# Patient Record
Sex: FEMALE | Employment: OTHER | URBAN - METROPOLITAN AREA
[De-identification: names, ages, dates, MRNs, and addresses within clinical notes are randomized per-mention and may not be internally consistent; named-entity substitution may affect disease eponyms.]

---

## 2022-03-01 ENCOUNTER — NURSING HOME VISIT (OUTPATIENT)
Dept: GERIATRICS | Facility: OTHER | Age: 87
End: 2022-03-01
Payer: MEDICARE

## 2022-03-01 DIAGNOSIS — F41.9 ANXIETY: ICD-10-CM

## 2022-03-01 DIAGNOSIS — I48.20 CHRONIC ATRIAL FIBRILLATION (HCC): ICD-10-CM

## 2022-03-01 DIAGNOSIS — I50.32 CHRONIC DIASTOLIC CONGESTIVE HEART FAILURE (HCC): ICD-10-CM

## 2022-03-01 DIAGNOSIS — K21.00 GASTROESOPHAGEAL REFLUX DISEASE WITH ESOPHAGITIS WITHOUT HEMORRHAGE: ICD-10-CM

## 2022-03-01 DIAGNOSIS — D62 ACUTE BLOOD LOSS ANEMIA: ICD-10-CM

## 2022-03-01 DIAGNOSIS — I10 ESSENTIAL HYPERTENSION: ICD-10-CM

## 2022-03-01 DIAGNOSIS — R04.0 EPISTAXIS: Primary | ICD-10-CM

## 2022-03-01 DIAGNOSIS — F32.A DEPRESSION, UNSPECIFIED DEPRESSION TYPE: ICD-10-CM

## 2022-03-01 PROBLEM — Z95.2 S/P TAVR (TRANSCATHETER AORTIC VALVE REPLACEMENT): Status: ACTIVE | Noted: 2019-08-19

## 2022-03-01 PROBLEM — R10.13 EPIGASTRIC ABDOMINAL PAIN: Status: ACTIVE | Noted: 2021-04-15

## 2022-03-01 PROBLEM — R13.10 DYSPHAGIA: Status: ACTIVE | Noted: 2018-08-08

## 2022-03-01 PROBLEM — K44.9 PARAESOPHAGEAL HERNIA: Status: ACTIVE | Noted: 2019-01-02

## 2022-03-01 PROBLEM — R55 NEAR SYNCOPE: Status: ACTIVE | Noted: 2020-04-29

## 2022-03-01 PROBLEM — R10.9 ABDOMINAL PAIN: Status: ACTIVE | Noted: 2021-04-29

## 2022-03-01 PROBLEM — K74.69 OTHER CIRRHOSIS OF LIVER (HCC): Status: ACTIVE | Noted: 2021-05-03

## 2022-03-01 PROBLEM — N18.31 STAGE 3A CHRONIC KIDNEY DISEASE (HCC): Status: ACTIVE | Noted: 2021-04-30

## 2022-03-01 PROBLEM — Z95.0 PACEMAKER: Status: ACTIVE | Noted: 2017-02-23

## 2022-03-01 PROBLEM — K21.9 GERD (GASTROESOPHAGEAL REFLUX DISEASE): Status: ACTIVE | Noted: 2022-03-01

## 2022-03-01 PROCEDURE — 99306 1ST NF CARE HIGH MDM 50: CPT | Performed by: INTERNAL MEDICINE

## 2022-03-01 RX ORDER — METOPROLOL SUCCINATE 25 MG/1
25 TABLET, EXTENDED RELEASE ORAL DAILY
COMMUNITY
Start: 2022-03-01 | End: 2023-03-01

## 2022-03-01 RX ORDER — MIRTAZAPINE 15 MG/1
15 TABLET, FILM COATED ORAL
COMMUNITY

## 2022-03-01 RX ORDER — FUROSEMIDE 20 MG/1
20 TABLET ORAL DAILY
COMMUNITY

## 2022-03-01 RX ORDER — CLONAZEPAM 0.5 MG/1
TABLET ORAL 4 TIMES DAILY
COMMUNITY

## 2022-03-01 RX ORDER — DULOXETIN HYDROCHLORIDE 60 MG/1
60 CAPSULE, DELAYED RELEASE ORAL 2 TIMES DAILY
COMMUNITY

## 2022-03-01 RX ORDER — NITROGLYCERIN 0.4 MG/1
0.4 TABLET SUBLINGUAL AS NEEDED
COMMUNITY

## 2022-03-01 RX ORDER — PANTOPRAZOLE SODIUM 40 MG/1
1 TABLET, DELAYED RELEASE ORAL
COMMUNITY
Start: 2021-05-03 | End: 2022-05-03

## 2022-03-01 RX ORDER — GABAPENTIN 100 MG/1
100 CAPSULE ORAL EVERY 12 HOURS
COMMUNITY

## 2022-03-01 RX ORDER — LISINOPRIL 40 MG/1
40 TABLET ORAL DAILY
COMMUNITY

## 2022-03-01 RX ORDER — ACETAMINOPHEN 325 MG/1
650 TABLET ORAL EVERY 6 HOURS PRN
COMMUNITY

## 2022-03-01 NOTE — ASSESSMENT & PLAN NOTE
S/p rhino rocket removed on 2/19 then had IR perform embolization of the external carotid branches on 2/18  Also received transfusion due to amount of blood loss  Continue monitoring any further symptoms  Was restarted back on eliquis

## 2022-03-01 NOTE — PROGRESS NOTES
[facility] notes  SHORT TERM REHAB       NAME: Deneen Rosario  AGE: 80 y o  SEX: female    DATE OF ENCOUNTER: 3/1/2022    Assessment and Plan   Epistaxis  S/p rhino rocket removed on 2/19 then had IR perform embolization of the external carotid branches on 2/18  Also received transfusion due to amount of blood loss  Continue monitoring any further symptoms  Was restarted back on eliquis     Acute blood loss anemia  Hb on discharge was 9 ( but mostly running between 7-8) and had transfusion when it had dropped to 6 8 on 2/21/22  Continue monitoring for bleeding  Will repeat CBC     Atrial fibrillation (HCC)  Rate controlled  Continue current meds  Has a pacemaker  Continue eliquis and monitor for bleeding     Chronic diastolic congestive heart failure (HCC)  Continue lasix  Seems euvolemic  Continue monitoring for worsening signs and and symptoms of CHF  Continue monitoring weight           Anxiety  Reports taking 1/2 tab in am and 1 tab 3 times a day after but records indicate patient on 1 tab 3 times a day  Continue current meds  Continue monitoring for worsening symptoms     GERD (gastroesophageal reflux disease)  EGD done this visit showed grade B esophagitis  Continue PPI  Son reports intermittently she has abdominal pain which is relieved by maalox but patient does not like it much    Essential hypertension  Continue current meds  Continue monitoring BP    Depression  Continue current meds  Continue monitoring symptoms         Chief Complaint     Feeling anxious    History of Present Illness     79 yo female [facility] for [facility]  Patient reports that she has had on and off nose bleed but usually resolves but this last time it was not stopping she press her help button who recommended to go to the ER  She was admitted and evaluated by ENT  She had her eliquis held   She had rhino rocket placed then had IR embolize her bilateral external carotid branches  She then had GI evaluate and do an EGD which showed grade B esophagitis and a flex sig which showed diverticulosis  She also had transfusion during her stay  Once she was stable she was discharged to [address] [place] [address] for therapy  Also called and talked to son who confirmed the history and also report that she does have significant history of anxiety and GERD  Reviewed labs and imaging reports from the hospital     PMHx     Past Medical History:   Diagnosis Date    Anemia requiring transfusions     Anxiety     Aortic stenosis     Arthritis     Atrial fibrillation (HCC)     Cervical dystonia     Coronary artery disease     Depression     Dysphagia     Elevated LFTs     Gait difficulty     GERD (gastroesophageal reflux disease)     Hiatal hernia     HL (hearing loss)     Hyperlipidemia     Hypertension     Myocardial infarct (HCC)     Neuralgia     [facility])      Past Surgical History:   Procedure Laterality Date    ANOMALOUS PULMONARY VENOUS RETURN REPAIR, TOTAL      AORTIC VALVE REPLACEMENT      CARDIAC PACEMAKER PLACEMENT      COLONOSCOPY      JOINT REPLACEMENT      KNEE SURGERY      PARAESOPHAGEAL HERNIA REPAIR      UPPER GASTROINTESTINAL ENDOSCOPY       Family History   Problem Relation Age of Onset    Stroke Mother     Hypertension Mother     Heart disease Father     Heart disease Paternal Uncle      Social History     Socioeconomic History    Marital status:       Spouse name: None    Number of children: None    Years of education: None    Highest education level: None   Occupational History    None   Tobacco Use    Smoking status: Never Smoker    Smokeless tobacco: Never Used   Substance and Sexual Activity    Alcohol use: Never    Drug use: Never    Sexual activity: None   Other Topics Concern    None   Social History Narrative    None     Social Determinants of Health     Financial Resource Strain: Not on file   Food Insecurity: Not on file   Transportation Needs: Not on file   Physical Activity: Not on file   Stress: Not on file   Social Connections: Not on file   Intimate Partner Violence: Not on file   Housing Stability: Not on file     Allergies   Allergen Reactions    Sulfa Antibiotics Shortness Of Breath    Lactose - Food Allergy Diarrhea and Other (See Comments)     Intolerance- gets gassy or diarrhea    Penicillins Rash       Review of Systems     Anxious  Shortness of breath at times with exertion  All other review of system negative      Objective   Vital signs:  BP: 118/53  HR: 71  RR: 18  TEMP: 97 9F  SAT : 97% on RA    PHYSICAL EXAM:  GENERAL: no acute distress  SKIN: warm, dry, no rash, no cyanosis  HEENT: normocephalic, atraumatic, no JVD, no Thyromegaly, no lymphadenopathy  LUNGS: CTA, no wheezing, no rales, expanded equally, no chest tenderness   HEART: normal rhythm, normal rate, no murmur, no gallop, PPM on left upper chest wall  ABDOMEN: soft non tender non distended bs+, no guarding or rebound tenderness  :  no suprapubic tenderness  MUSCULOSKELETAL: strength about 4+/5 all extremities,decreased ROM within shoulders R>L, no edema of the legs, no calf tenderness  NEUROLOGY: awake, alert, Ox3 except got day of the week wrong, able to recall 1/3 object but had difficulty with placing the time on the clock  CN2-12 intact  PSYCH: cooperative, pleasant  Pertinent Laboratory/Diagnostic Studies:  Recent labs and diagnostic tests reviewed in nursing home EMR    Current Medications   Medications reviewed and signed off on nursing home EMR

## 2022-03-01 NOTE — ASSESSMENT & PLAN NOTE
EGD done this visit showed grade B esophagitis  Continue PPI  Son reports intermittently she has abdominal pain which is relieved by maalox but patient does not like it much

## 2022-03-01 NOTE — ASSESSMENT & PLAN NOTE
Continue lasix  Seems euvolemic  Continue monitoring for worsening signs and and symptoms of CHF  Continue monitoring weight

## 2022-03-01 NOTE — ASSESSMENT & PLAN NOTE
Hb on discharge was 9 ( but mostly running between 7-8) and had transfusion when it had dropped to 6 8 on 2/21/22  Continue monitoring for bleeding  Will repeat CBC

## 2022-03-01 NOTE — ASSESSMENT & PLAN NOTE
Reports taking 1/2 tab in am and 1 tab 3 times a day after but records indicate patient on 1 tab 3 times a day  Continue current meds  Continue monitoring for worsening symptoms

## 2022-03-09 ENCOUNTER — NURSING HOME VISIT (OUTPATIENT)
Dept: GERIATRICS | Facility: OTHER | Age: 87
End: 2022-03-09
Payer: MEDICARE

## 2022-03-09 DIAGNOSIS — R04.0 EPISTAXIS: Primary | ICD-10-CM

## 2022-03-09 DIAGNOSIS — D62 ACUTE BLOOD LOSS ANEMIA: ICD-10-CM

## 2022-03-09 DIAGNOSIS — F41.9 ANXIETY: ICD-10-CM

## 2022-03-09 DIAGNOSIS — E87.6 HYPOKALEMIA: ICD-10-CM

## 2022-03-09 PROCEDURE — 99309 SBSQ NF CARE MODERATE MDM 30: CPT | Performed by: INTERNAL MEDICINE

## 2022-03-09 RX ORDER — IPRATROPIUM BROMIDE AND ALBUTEROL SULFATE 2.5; .5 MG/3ML; MG/3ML
3 SOLUTION RESPIRATORY (INHALATION) EVERY 4 HOURS PRN
COMMUNITY

## 2022-03-09 NOTE — ASSESSMENT & PLAN NOTE
Patient still concerned that there is something there after the procedure  Okayed for patient to follow up with ENT as scheduled     Currently no signs of bleeding

## 2022-03-09 NOTE — ASSESSMENT & PLAN NOTE
Had to increase meds back to the level she was taking back home at  [facility] to be better since then  Continue monitoring symptoms  Continue current meds

## 2022-03-09 NOTE — PROGRESS NOTES
[facility] notes  SHORT TERM REHAB       NAME: Chiqui Mortensen  AGE: 80 y o  SEX: female    DATE OF ENCOUNTER: 3/9/2022    Assessment and Plan   Epistaxis  Patient still concerned that there is something there after the procedure  Okayed for patient to follow up with ENT as scheduled  Currently no signs of bleeding     Acute blood loss anemia  Hb was 8 3 on labs 2 days ago  Continue monitoring for bleeding      Hypokalemia  Supplemented k+    Anxiety  Had to increase meds back to the level she was taking back home at  [facility] to be better since then  Continue monitoring symptoms  Continue current meds        Chief Complaint     No new complaints    History of Present Illness     81 yo female seen for follow up  Patient seen for her epistaxis, anemia, hypokalemia and anxiety  Reviewed nursing notes and also reviewed therapy notes since last visit  PMHx     Past Medical History:   Diagnosis Date    Anemia requiring transfusions     Anxiety     Aortic stenosis     Arthritis     Atrial fibrillation (HCC)     Cervical dystonia     Coronary artery disease     Depression     Dysphagia     Elevated LFTs     Gait difficulty     GERD (gastroesophageal reflux disease)     Hiatal hernia     HL (hearing loss)     Hyperlipidemia     Hypertension     Myocardial infarct (HCC)     Neuralgia     SSS (sick sinus syndrome) (HCC)      Past Surgical History:   Procedure Laterality Date    ANOMALOUS PULMONARY VENOUS RETURN REPAIR, TOTAL      AORTIC VALVE REPLACEMENT      CARDIAC PACEMAKER PLACEMENT      COLONOSCOPY      JOINT REPLACEMENT      KNEE SURGERY      PARAESOPHAGEAL HERNIA REPAIR      UPPER GASTROINTESTINAL ENDOSCOPY       Family History   Problem Relation Age of Onset    Stroke Mother     Hypertension Mother     Heart disease Father     Heart disease Paternal Uncle      Social History     Socioeconomic History    Marital status:       Spouse name: Not on file    Number of children: Not on file    Years of education: Not on file    Highest education level: Not on file   Occupational History    Not on file   Tobacco Use    Smoking status: Never Smoker    Smokeless tobacco: Never Used   Substance and Sexual Activity    Alcohol use: Never    Drug use: Never    Sexual activity: Not on file   Other Topics Concern    Not on file   Social History Narrative    Not on file     Social Determinants of Health     Financial Resource Strain: Not on file   Food Insecurity: Not on file   Transportation Needs: Not on file   Physical Activity: Not on file   Stress: Not on file   Social Connections: Not on file   Intimate Partner Violence: Not on file   Housing Stability: Not on file     Allergies   Allergen Reactions    Sulfa Antibiotics Shortness Of Breath    Lactose - Food Allergy Diarrhea and Other (See Comments)     Intolerance- gets gassy or diarrhea    Penicillins Rash       Review of Systems     Denies any pain at present but does report had some pain after eating when they tried to do therapy, states it happens after she eats sometimes so now they are going to give some time after she eats then do therapy  All other review of system negative        Objective   Vital signs:  BP: 135/88  HR: 76  RR: 18  TEMP: 98 9F    PHYSICAL EXAM:  GENERAL: no acute distress  SKIN: warm, dry, no rash, no cyanosis  HEENT: normocephalic, atraumatic, no JVD, no Thyromegaly, no lymphadenopathy  LUNGS: CTA, no wheezing, no rales, expanded equally, no chest tenderness   HEART: normal rhythm, normal rate, no murmur, no gallop, PPM on left chest wall  ABDOMEN: soft non tender non distended bs+, no guarding or rebound tenderness  :  no suprapubic tenderness  MUSCULOSKELETAL: strength about 4+/5 all extremities, no calf tenderness  NEUROLOGY: awake, alert, Ox3, CN2-12 intact  PSYCH: cooperative, pleasant         Pertinent Laboratory/Diagnostic Studies:  Recent labs and diagnostic tests reviewed in nursing home EMR    Current Medications   Medications reviewed

## 2022-03-16 ENCOUNTER — NURSING HOME VISIT (OUTPATIENT)
Dept: GERIATRICS | Facility: OTHER | Age: 87
End: 2022-03-16
Payer: MEDICARE

## 2022-03-16 DIAGNOSIS — N18.31 STAGE 3A CHRONIC KIDNEY DISEASE (HCC): ICD-10-CM

## 2022-03-16 DIAGNOSIS — R04.0 EPISTAXIS: Primary | ICD-10-CM

## 2022-03-16 DIAGNOSIS — I50.32 CHRONIC DIASTOLIC CONGESTIVE HEART FAILURE (HCC): ICD-10-CM

## 2022-03-16 PROCEDURE — 99309 SBSQ NF CARE MODERATE MDM 30: CPT | Performed by: INTERNAL MEDICINE

## 2022-03-16 NOTE — PROGRESS NOTES
[facility] notes  SHORT TERM REHAB       NAME: Thang Blackburn  AGE: 80 y o  SEX: female    DATE OF ENCOUNTER: 3/16/2022    Assessment and Plan   Epistaxis  Hb 9 0 on 3/14/22  Seen by ENT and reviewed follow up recommendations  Continue monitoring symptoms   Continue ocean nasal spray      Chronic diastolic congestive heart failure ([address] )  Had increased dose of lasix due to increase in swelling and weight but back down  Continue current meds  Continue monitoring weight     Stage 3a chronic kidney disease (HCC)  Cr  0 9 on 3/14/22 seems to be better then usual baseline   Continue monitoring as out patient       Chief Complaint     No new complaints    History of Present Illness     81 yo female seen for follow up  Patient seen for epistaxis, CHF and CKDIII  Reviewed nursing notes since last visit  PMHx     Past Medical History:   Diagnosis Date    Anemia requiring transfusions     Anxiety     Aortic stenosis     Arthritis     Atrial fibrillation (HCC)     Cervical dystonia     Coronary artery disease     Depression     Dysphagia     Elevated LFTs     Gait difficulty     GERD (gastroesophageal reflux disease)     Hiatal hernia     HL (hearing loss)     Hyperlipidemia     Hypertension     Myocardial infarct (HCC)     Neuralgia     [facility]SS [facility]ick [facility] syndrome) ([facility])      Past Surgical History:   Procedure Laterality Date    ANOMALOUS PULMONARY VENOUS RETURN REPAIR, TOTAL      AORTIC VALVE REPLACEMENT      CARDIAC PACEMAKER PLACEMENT      COLONOSCOPY      JOINT REPLACEMENT      KNEE SURGERY      PARAESOPHAGEAL HERNIA REPAIR      UPPER GASTROINTESTINAL ENDOSCOPY       Family History   Problem Relation Age of Onset    Stroke Mother     Hypertension Mother     Heart disease Father     Heart disease Paternal Uncle      Social History     Socioeconomic History    Marital status:       Spouse name: Not on file    Number of children: Not on file    Years of education: Not on file    Highest education level: Not on file   Occupational History    Not on file   Tobacco Use    Smoking status: Never Smoker    Smokeless tobacco: Never Used   Substance and Sexual Activity    Alcohol use: Never    Drug use: Never    Sexual activity: Not on file   Other Topics Concern    Not on file   Social History Narrative    Not on file     Social Determinants of Health     Financial Resource Strain: Not on file   Food Insecurity: Not on file   Transportation Needs: Not on file   Physical Activity: Not on file   Stress: Not on file   Social Connections: Not on file   Intimate Partner Violence: Not on file   Housing Stability: Not on file     Allergies   Allergen Reactions    Sulfa Antibiotics Shortness Of Breath    Lactose - Food Allergy Diarrhea and Other (See Comments)     Intolerance- gets gassy or diarrhea    Penicillins Rash       Review of Systems     Denies any pain or shortness of breath  Some complaints of ACE wrap being to tight   All other review of system negative        Objective   Vital signs:  BP: 120/70  HR: 72  RR: 18  TEMP: 97 9F    PHYSICAL EXAM:  GENERAL: no acute distress  SKIN: warm, dry, no rash, no cyanosis  HEENT: normocephalic, atraumatic, no JVD, no Thyromegaly, no lymphadenopathy  LUNGS: CTA, no wheezing, no rales, expanded equally, no chest tenderness   HEART: normal rhythm, normal rate, no murmur, no gallop, PPM on the left chest wall  ABDOMEN: soft non tender non distended bs+, no guarding or rebound tenderness  :  no suprapubic tenderness  MUSCULOSKELETAL: strength about 4+/5 all extremities, no calf tenderness  NEUROLOGY: awake, alert, Ox3, CN2-12 intact  PSYCH: cooperative, pleasant         Pertinent Laboratory/Diagnostic Studies:  Recent labs and diagnostic tests reviewed in nursing home EMR    Current Medications   Medications reviewed

## 2022-03-16 NOTE — ASSESSMENT & PLAN NOTE
Had increased dose of lasix due to increase in swelling and weight but back down  Continue current meds  Continue monitoring weight

## 2022-03-16 NOTE — ASSESSMENT & PLAN NOTE
Hb 9 0 on 3/14/22  Seen by ENT and reviewed follow up recommendations  Continue monitoring symptoms   Continue ocean nasal spray

## 2022-03-22 ENCOUNTER — NURSING HOME VISIT (OUTPATIENT)
Dept: GERIATRICS | Facility: OTHER | Age: 87
End: 2022-03-22
Payer: MEDICARE

## 2022-03-22 DIAGNOSIS — R26.2 AMBULATORY DYSFUNCTION: ICD-10-CM

## 2022-03-22 DIAGNOSIS — R04.0 EPISTAXIS: Primary | ICD-10-CM

## 2022-03-22 DIAGNOSIS — I50.32 CHRONIC DIASTOLIC CONGESTIVE HEART FAILURE (HCC): ICD-10-CM

## 2022-03-22 PROCEDURE — 99309 SBSQ NF CARE MODERATE MDM 30: CPT | Performed by: INTERNAL MEDICINE

## 2022-03-22 NOTE — ASSESSMENT & PLAN NOTE
Reviewed therapy notes  Continues with therapy  Walks 150-200 feet with supervision  Continue with safety measures

## 2022-03-22 NOTE — ASSESSMENT & PLAN NOTE
Weight up slightly  Swelling also up slightly  Will increase lasix by 20mg po daily x 3 days then back to the usual dose  Continue monitoring weight  Continue monitoring swelling

## 2022-03-22 NOTE — PROGRESS NOTES
[facility] notes  SHORT TERM REHAB       NAME: Monica Pickens  AGE: 80 y o  SEX: female    DATE OF ENCOUNTER: 3/22/2022    Assessment and Plan   Epistaxis  No further signs of bleeding  Stable  Continue monitoring  Continue ocean nasal spray      Ambulatory dysfunction  Reviewed therapy notes  Continues with therapy  Walks 150-200 feet with supervision  Continue with safety measures       Chronic diastolic congestive heart failure (HCC)  Weight up slightly  Swelling also up slightly  Will increase lasix by 20mg po daily x 3 days then back to the usual dose  Continue monitoring weight  Continue monitoring swelling          Chief Complaint     No new complaints    History of Present Illness     81 yo female seen for follow up  Patient seen for her epistaxis, ambulatory dysfunction and CHF  Reviewed nursing notes since last visit  PMHx     Past Medical History:   Diagnosis Date    Anemia requiring transfusions     Anxiety     Aortic stenosis     Arthritis     Atrial fibrillation (HCC)     Cervical dystonia     Coronary artery disease     Depression     Dysphagia     Elevated LFTs     Gait difficulty     GERD (gastroesophageal reflux disease)     Hiatal hernia     HL (hearing loss)     Hyperlipidemia     Hypertension     Myocardial infarct (HCC)     Neuralgia     [facility]) ([facility])      Past Surgical History:   Procedure Laterality Date    ANOMALOUS PULMONARY VENOUS RETURN REPAIR, TOTAL      AORTIC VALVE REPLACEMENT      CARDIAC PACEMAKER PLACEMENT      COLONOSCOPY      JOINT REPLACEMENT      KNEE SURGERY      PARAESOPHAGEAL HERNIA REPAIR      UPPER GASTROINTESTINAL ENDOSCOPY       Family History   Problem Relation Age of Onset    Stroke Mother     Hypertension Mother     Heart disease Father     Heart disease Paternal Uncle      Social History     Socioeconomic History    Marital status:       Spouse name: Not on file    Number of children: Not on file    Years of education: Not on file    Highest education level: Not on file   Occupational History    Not on file   Tobacco Use    Smoking status: Never Smoker    Smokeless tobacco: Never Used   Substance and Sexual Activity    Alcohol use: Never    Drug use: Never    Sexual activity: Not on file   Other Topics Concern    Not on file   Social History Narrative    Not on file     Social Determinants of Health     Financial Resource Strain: Not on file   Food Insecurity: Not on file   Transportation Needs: Not on file   Physical Activity: Not on file   Stress: Not on file   Social Connections: Not on file   Intimate Partner Violence: Not on file   Housing Stability: Not on file     Allergies   Allergen Reactions    Sulfa Antibiotics Shortness Of Breath    Lactose - Food Allergy Diarrhea and Other (See Comments)     Intolerance- gets gassy or diarrhea    Penicillins Rash       Review of Systems     Denies any pain or shortness of breath  Some leg swelling   All other review of system negative    Objective   Vital signs:  BP: 108/69  HR: 76  RR: 18  TEMP: 98 0F    PHYSICAL EXAM:  GENERAL: no acute distress  SKIN: warm, dry, no rash, no cyanosis  HEENT: normocephalic, atraumatic, no JVD, no Thyromegaly, no lymphadenopathy  LUNGS: CTA, no wheezing, no rales, expanded equally, no chest tenderness   HEART: normal rhythm, normal rate, no murmur, no gallop  ABDOMEN: soft non tender non distended bs+, no guarding or rebound tenderness  :  no suprapubic tenderness  MUSCULOSKELETAL: strength about 4+/5 all extremities, bilateral lower leg edema1, no calf tenderness  NEUROLOGY: awake, alert, Ox3, CN2-12 intact  PSYCH: cooperative, pleasant         Pertinent Laboratory/Diagnostic Studies:  Recent labs and diagnostic tests reviewed in nursing home EMR    Current Medications   Medications reviewed

## 2022-03-24 ENCOUNTER — NURSING HOME VISIT (OUTPATIENT)
Dept: GERIATRICS | Facility: OTHER | Age: 87
End: 2022-03-24
Payer: MEDICARE

## 2022-03-24 DIAGNOSIS — R04.0 EPISTAXIS: Primary | ICD-10-CM

## 2022-03-24 DIAGNOSIS — I48.20 CHRONIC ATRIAL FIBRILLATION (HCC): ICD-10-CM

## 2022-03-24 DIAGNOSIS — R26.2 AMBULATORY DYSFUNCTION: ICD-10-CM

## 2022-03-24 DIAGNOSIS — I50.32 CHRONIC DIASTOLIC CONGESTIVE HEART FAILURE (HCC): ICD-10-CM

## 2022-03-24 DIAGNOSIS — F41.9 ANXIETY: ICD-10-CM

## 2022-03-24 DIAGNOSIS — F32.A DEPRESSION, UNSPECIFIED DEPRESSION TYPE: ICD-10-CM

## 2022-03-24 DIAGNOSIS — K21.00 GASTROESOPHAGEAL REFLUX DISEASE WITH ESOPHAGITIS WITHOUT HEMORRHAGE: ICD-10-CM

## 2022-03-24 DIAGNOSIS — D62 ACUTE BLOOD LOSS ANEMIA: ICD-10-CM

## 2022-03-24 DIAGNOSIS — I10 ESSENTIAL HYPERTENSION: ICD-10-CM

## 2022-03-24 PROCEDURE — 99316 NF DSCHRG MGMT 30 MIN+: CPT | Performed by: INTERNAL MEDICINE

## 2022-03-24 NOTE — ASSESSMENT & PLAN NOTE
At times received extra dose of lasix which she may need as out patient intermittently   Continue current meds  Continue monitoring weight   Continue monitoring swelling

## 2022-03-24 NOTE — PROGRESS NOTES
[facility] notes  SHORT TERM REHAB Discharge summary      NAME: Kenji Woods  AGE: 80 y o  SEX: female    DATE OF ENCOUNTER: 3/24/2022    Assessment and Plan   Epistaxis  No further sign of bleeding  Stable  Also followed up by ENT  Continue ocean nasal sprapy    Acute blood loss anemia  Hb 9 0 on 3/14/22  Continue monitoring as out patient      Atrial fibrillation ([address] )  Rate controlled  Continue current meds  Has a pacemaker  Continue eliquis     Chronic diastolic congestive heart failure (HCC)  At times received extra dose of lasix which she may need as out patient intermittently   Continue current meds  Continue monitoring weight   Continue monitoring swelling           Anxiety  Seems to be doing better overall as she is back to the level of meds she was  Continue current meds  Continue monitoring symptoms     Ambulatory dysfunction  Reviewed therapy notes  Continue with safety measures      GERD (gastroesophageal reflux disease)  Continue current meds  Continue monitoring symptoms  May need extra meds at times as per son reports    Essential hypertension  BP reviewed from facility records  Seems to be in good range  Continue monitoring BP    Depression  Continue current meds  Continue monitoring symptoms       Chief Complaint     No new complaints    History of Present Illness     79 yo female seen for discharge  Patient was admitted to [facility] after hospitalization for uncontrolled epistaxis requiring embolization  Patient was sent to [facility] for therapy  She was also managed for increase in edema and weight during her stay and her h/h was monitored  She continued to make progress with therapy that now she is being discharged to the personal care  Reviewed nursing notes since last visit       PMHx     Past Medical History:   Diagnosis Date    Anemia requiring transfusions     Anxiety     Aortic stenosis     Arthritis     Atrial fibrillation (HCC)     Cervical dystonia     Coronary artery disease     Depression     Dysphagia     Elevated LFTs     Gait difficulty     GERD (gastroesophageal reflux disease)     Hiatal hernia     HL (hearing loss)     Hyperlipidemia     Hypertension     Myocardial infarct (HCC)     Neuralgia     SSS (sick sinus syndrome) (HCC)      Past Surgical History:   Procedure Laterality Date    ANOMALOUS PULMONARY VENOUS RETURN REPAIR, TOTAL      AORTIC VALVE REPLACEMENT      CARDIAC PACEMAKER PLACEMENT      COLONOSCOPY      JOINT REPLACEMENT      KNEE SURGERY      PARAESOPHAGEAL HERNIA REPAIR      UPPER GASTROINTESTINAL ENDOSCOPY       Family History   Problem Relation Age of Onset    Stroke Mother     Hypertension Mother     Heart disease Father     Heart disease Paternal Uncle      Social History     Socioeconomic History    Marital status:       Spouse name: Not on file    Number of children: Not on file    Years of education: Not on file    Highest education level: Not on file   Occupational History    Not on file   Tobacco Use    Smoking status: Never Smoker    Smokeless tobacco: Never Used   Substance and Sexual Activity    Alcohol use: Never    Drug use: Never    Sexual activity: Not on file   Other Topics Concern    Not on file   Social History Narrative    Not on file     Social Determinants of Health     Financial Resource Strain: Not on file   Food Insecurity: Not on file   Transportation Needs: Not on file   Physical Activity: Not on file   Stress: Not on file   Social Connections: Not on file   Intimate Partner Violence: Not on file   Housing Stability: Not on file     Allergies   Allergen Reactions    Sulfa Antibiotics Shortness Of Breath    Lactose - Food Allergy Diarrhea and Other (See Comments)     Intolerance- gets gassy or diarrhea    Penicillins Rash       Review of Systems     Leg swelling  All other review of system negative      Objective   Vital signs:  BP: 117/89  HR: 81  RR: 18  TEMP: 98 0F      PHYSICAL EXAM:  GENERAL: no acute distress  SKIN: warm, dry, no rash, no cyanosis  HEENT: normocephalic, atraumatic, no JVD, no Thyromegaly, no lymphadenopathy  LUNGS: CTA, no wheezing, no rales, expanded equally, no chest tenderness   HEART: normal rhythm, normal rate, no murmur, no gallop  ABDOMEN: soft non tender non distended bs+, no guarding or rebound tenderness  :  no suprapubic tenderness  MUSCULOSKELETAL: strength about 4+/5 all extremities, ROM within normal, bilateral lower leg edema, no calf tenderness  NEUROLOGY: awake, alert, Ox3, CN2-12 intact  PSYCH: cooperative, pleasant         Pertinent Laboratory/Diagnostic Studies:  Recent labs and diagnostic tests reviewed in nursing home EMR    Current Medications  Medications reviewed with patient/family  Prescriptions provided for medications needed    Prescriptions provided for services needed    Time spend on patient discharge 40 mintues

## 2022-03-24 NOTE — ASSESSMENT & PLAN NOTE
Seems to be doing better overall as she is back to the level of meds she was  Continue current meds  Continue monitoring symptoms

## 2022-03-24 NOTE — ASSESSMENT & PLAN NOTE
Continue current meds  Continue monitoring symptoms  May need extra meds at times as per son reports

## 2023-06-01 DIAGNOSIS — F41.9 ANXIETY: Primary | ICD-10-CM

## 2023-06-01 RX ORDER — CLONAZEPAM 0.5 MG/1
0.5 TABLET ORAL 3 TIMES DAILY
Qty: 21 TABLET | Refills: 0 | Status: SHIPPED | OUTPATIENT
Start: 2023-06-01 | End: 2023-06-08 | Stop reason: SDUPTHER

## 2023-06-02 ENCOUNTER — NURSING HOME VISIT (OUTPATIENT)
Dept: GERIATRICS | Facility: OTHER | Age: 88
End: 2023-06-02

## 2023-06-02 DIAGNOSIS — N18.31 STAGE 3A CHRONIC KIDNEY DISEASE (HCC): ICD-10-CM

## 2023-06-02 DIAGNOSIS — J96.01 ACUTE RESPIRATORY FAILURE WITH HYPOXIA (HCC): ICD-10-CM

## 2023-06-02 DIAGNOSIS — I50.32 CHRONIC DIASTOLIC CONGESTIVE HEART FAILURE (HCC): ICD-10-CM

## 2023-06-02 DIAGNOSIS — F41.9 ANXIETY AND DEPRESSION: ICD-10-CM

## 2023-06-02 DIAGNOSIS — R26.2 AMBULATORY DYSFUNCTION: ICD-10-CM

## 2023-06-02 DIAGNOSIS — I48.20 CHRONIC ATRIAL FIBRILLATION (HCC): ICD-10-CM

## 2023-06-02 DIAGNOSIS — K21.00 GASTROESOPHAGEAL REFLUX DISEASE WITH ESOPHAGITIS WITHOUT HEMORRHAGE: ICD-10-CM

## 2023-06-02 DIAGNOSIS — I25.10 CORONARY ARTERY DISEASE INVOLVING NATIVE CORONARY ARTERY OF NATIVE HEART WITHOUT ANGINA PECTORIS: ICD-10-CM

## 2023-06-02 DIAGNOSIS — J18.9 PNEUMONIA OF RIGHT UPPER LOBE DUE TO INFECTIOUS ORGANISM: Primary | ICD-10-CM

## 2023-06-02 DIAGNOSIS — I10 ESSENTIAL HYPERTENSION: ICD-10-CM

## 2023-06-02 DIAGNOSIS — D64.9 NORMOCYTIC ANEMIA: ICD-10-CM

## 2023-06-02 DIAGNOSIS — F32.A ANXIETY AND DEPRESSION: ICD-10-CM

## 2023-06-02 DIAGNOSIS — R13.12 OROPHARYNGEAL DYSPHAGIA: ICD-10-CM

## 2023-06-02 DIAGNOSIS — F32.A DEPRESSION, UNSPECIFIED DEPRESSION TYPE: ICD-10-CM

## 2023-06-02 PROBLEM — N30.00 ACUTE CYSTITIS: Status: ACTIVE | Noted: 2023-06-02

## 2023-06-02 RX ORDER — METOPROLOL SUCCINATE 25 MG/1
1 TABLET, EXTENDED RELEASE ORAL DAILY
COMMUNITY
Start: 2023-05-09

## 2023-06-02 RX ORDER — METHOCARBAMOL 500 MG/1
500 TABLET, FILM COATED ORAL 4 TIMES DAILY PRN
COMMUNITY

## 2023-06-02 RX ORDER — ECHINACEA PURPUREA EXTRACT 125 MG
1 TABLET ORAL AS NEEDED
COMMUNITY

## 2023-06-02 RX ORDER — PANTOPRAZOLE SODIUM 40 MG/1
1 TABLET, DELAYED RELEASE ORAL 2 TIMES DAILY
COMMUNITY
Start: 2023-05-09

## 2023-06-02 RX ORDER — KETOROLAC TROMETHAMINE 5 MG/ML
SOLUTION OPHTHALMIC
COMMUNITY
Start: 2023-03-08

## 2023-06-02 NOTE — ASSESSMENT & PLAN NOTE
Acute respiratory failure requiring oxygen  Will complete the course of antibiotics  Continue to encourage deep breaths  Continue with activity as tolerable

## 2023-06-02 NOTE — ASSESSMENT & PLAN NOTE
Hb 10 5 which has been improving over the last few months  Continue monitoring for bleeding  Will repeat CBC

## 2023-06-02 NOTE — ASSESSMENT & PLAN NOTE
Continue current meds  Continue monitoring symptoms   Continue monitoring weight  Continue CHF protocol

## 2023-06-02 NOTE — ASSESSMENT & PLAN NOTE
Urine was contaminated in the hospital   Presumed to have UTI and treated with antibiotics   Also treated for pneumonia   Continue encourage fluids  Continue monitoring symptoms

## 2023-06-02 NOTE — PROGRESS NOTES
[facility] notes  SHORT TERM REHAB       NAME: Beatrice Dee  AGE: 80 y o  SEX: female    DATE OF ENCOUNTER: 6/2/2023    Assessment and Plan   Pneumonia of right upper lobe due to infectious organism  Acute respiratory failure requiring oxygen  Will complete the course of antibiotics  Continue to encourage deep breaths  Continue with activity as tolerable     Acute respiratory failure with hypoxia (HCC)  Continue oxygen   Titrate O2 off   Continue monitoring for worsening symptoms     Ambulatory dysfunction  Multifactorial exacerbated by pneumonia  PT/OT eval and treat     Anxiety and depression  Continue current meds  Continue monitoring symptoms   Continue with supportive care  Follows up with Dr Padmaja Phillip     Atrial fibrillation ([facility] )  Rate controlled  Continue current meds   Has pacemaker    Chronic diastolic congestive heart failure ([facility] )  Continue current meds  Continue monitoring symptoms   Continue monitoring weight  Continue CHF protocol     Coronary artery disease involving native coronary artery of native heart without angina pectoris  Asymptomatic  Continue current meds  Continue monitoring symptoms     Depression  Continue current meds  Continue monitoring symptoms     Essential hypertension  BP elevated today but for now continue current meds  Continue monitoring BP    GERD (gastroesophageal reflux disease)  Continue current meds  Continue monitoring symptoms     Stage 3a chronic kidney disease (HCC)  GFR 59  Continue monitoring kidney function    Normocytic anemia  Hb 10 5 which has been improving over the last few months  Continue monitoring for bleeding  Will repeat CBC     Dysphagia  Currently on ground meats  Continue monitoring for aspirations      Chief Complaint     Tired and weak    History of Present Illness     81 yo female seen for admission to [address] [place] [address] after hospitalization   As per patient she was sent to the hospital due to her having labored breathing and weak  She was diagnosed with pneumonia is what she recalls and treated for it  More history obtained from son who reports she has been having change in mental status from baseline with agitation but seems to have improved  It could have been due to her hypoxia also  In the hospital she required BIPAP and was found to have pneumonia and transition over to nasal cannula as symptoms improved  She was then discharged to [address] [place] [address] to continue with therapy  Reviewed hospital labs and imaging reports  PMHx     Past Medical History:   Diagnosis Date   • Anemia requiring transfusions    • Anxiety    • Aortic stenosis    • Arthritis    • Atrial fibrillation (HCC)    • Cervical dystonia    • Coronary artery disease    • Depression    • Dysphagia    • Elevated LFTs    • Gait difficulty    • GERD (gastroesophageal reflux disease)    • Hiatal hernia    • HL (hearing loss)    • Hyperlipidemia    • Hypertension    • Myocardial infarct ([facility])    • Neuralgia    • SSS (sick sinus syndrome) ([facility])      Past Surgical History:   Procedure Laterality Date   • ANOMALOUS PULMONARY VENOUS RETURN REPAIR, TOTAL     • AORTIC VALVE REPLACEMENT     • CARDIAC PACEMAKER PLACEMENT     • COLONOSCOPY     • JOINT REPLACEMENT     • KNEE SURGERY     • PARAESOPHAGEAL HERNIA REPAIR     • UPPER GASTROINTESTINAL ENDOSCOPY       Family History   Problem Relation Age of Onset   • Stroke Mother    • Hypertension Mother    • Heart disease Father    • Heart disease Paternal Uncle      Social History     Socioeconomic History   • Marital status:       Spouse name: None   • Number of children: None   • Years of education: None   • Highest education level: None   Occupational History   • None   Tobacco Use   • Smoking status: Never   • Smokeless tobacco: Never   Substance and Sexual Activity   • Alcohol use: Never   • Drug use: Never   • Sexual activity: None   Other Topics Concern   • None   Social History Narrative   • None     Social Determinants of Health     Financial Resource Strain: Not on file   Food Insecurity: Not on file   Transportation Needs: Not on file   Physical Activity: Not on file   Stress: Not on file   Social Connections: Not on file   Intimate Partner Violence: Not on file   Housing Stability: Not on file     Allergies   Allergen Reactions   • Sulfa Antibiotics Shortness Of Breath   • Lactose - Food Allergy Diarrhea and Other (See Comments)     Intolerance- gets gassy or diarrhea   • Penicillins Rash       Review of Systems     Denies any pain or shortness of breath  Does states feels weak  All other review of system negative        Objective   Vital signs:  BP: 137/65  HR: 63  RR: 20  TEMP: 97 7F  SAT : 99% on 2L    PHYSICAL EXAM:  GENERAL: no acute distress but slightly heavy breathing after coming out of the bathroom  SKIN: warm, dry, no rash, no cyanosis  HEENT: normocephalic, atraumatic, no JVD, no Thyromegaly, no lymphadenopathy  LUNGS: CTA, no wheezing, no rales, expanded equally, no chest tenderness   HEART: normal rhythm, normal rate, no murmur, no gallop  ABDOMEN: soft non tender non distended bs+, no guarding or rebound tenderness  :  no suprapubic tenderness  MUSCULOSKELETAL: strength about 4+/5 all extremities, decreased ROM within right shoulder,no calf tenderness  NEUROLOGY: awake, alert, Ox3 but got day of the week wrong, able to recall 1/3 object  CN2-12 intact  PSYCH: cooperative, pleasant  Pertinent Laboratory/Diagnostic Studies:  Recent labs and diagnostic tests reviewed in nursing home EMR    Current Medications   Medications reviewed and signed off on nursing home EMR

## 2023-06-08 DIAGNOSIS — F41.9 ANXIETY: ICD-10-CM

## 2023-06-08 RX ORDER — CLONAZEPAM 0.5 MG/1
0.5 TABLET ORAL 3 TIMES DAILY
Qty: 30 TABLET | Refills: 0 | Status: SHIPPED | OUTPATIENT
Start: 2023-06-08

## 2023-06-09 ENCOUNTER — NURSING HOME VISIT (OUTPATIENT)
Dept: GERIATRICS | Facility: OTHER | Age: 88
End: 2023-06-09
Payer: MEDICARE

## 2023-06-09 DIAGNOSIS — R13.12 OROPHARYNGEAL DYSPHAGIA: ICD-10-CM

## 2023-06-09 DIAGNOSIS — R26.2 AMBULATORY DYSFUNCTION: Primary | ICD-10-CM

## 2023-06-09 DIAGNOSIS — I50.32 CHRONIC DIASTOLIC CONGESTIVE HEART FAILURE (HCC): ICD-10-CM

## 2023-06-09 DIAGNOSIS — J96.01 ACUTE RESPIRATORY FAILURE WITH HYPOXIA (HCC): ICD-10-CM

## 2023-06-09 PROCEDURE — 99309 SBSQ NF CARE MODERATE MDM 30: CPT | Performed by: INTERNAL MEDICINE

## 2023-06-09 NOTE — ASSESSMENT & PLAN NOTE
Weight between 135-138 lbs  Asymptomatic  Continue current meds  Continue monitoring weight   Continue with CHF protocol

## 2023-06-09 NOTE — ASSESSMENT & PLAN NOTE
Multifactorial  Continues to work with therapy  Reviewed therapy notes  Discussed with therapy about patient   Continue with safety measures and fall precautions

## 2023-06-09 NOTE — PROGRESS NOTES
[facility] notes  SHORT TERM REHAB       NAME: Anum Perla  AGE: 80 y o  SEX: female    DATE OF ENCOUNTER: 6/9/2023    Assessment and Plan   Ambulatory dysfunction  Multifactorial  Continues to work with therapy  Reviewed therapy notes  Discussed with therapy about patient   Continue with safety measures and fall precautions     Acute respiratory failure with hypoxia ([address] )  Improved off of oxygen  Continue monitoring symptoms     Chronic diastolic congestive heart failure (HCC)  Weight between 135-138 lbs  Asymptomatic  Continue current meds  Continue monitoring weight   Continue with CHF protocol     Dysphagia  Diet upgraded to regular LETICIA   Continue monitoring for aspiration           Chief Complaint     No new complaints    History of Present Illness     81 yo female seen for follow up  Patient seen for her CHF, acute respiratory failure from pneumonia, dysphagia and ambulatory dysfunction  Reviewed nursing notes since last visit       PMHx     Past Medical History:   Diagnosis Date   • Anemia requiring transfusions    • Anxiety    • Aortic stenosis    • Arthritis    • Atrial fibrillation (HCC)    • Cervical dystonia    • Coronary artery disease    • Depression    • Dysphagia    • Elevated LFTs    • Gait difficulty    • GERD (gastroesophageal reflux disease)    • Hiatal hernia    • HL (hearing loss)    • Hyperlipidemia    • Hypertension    • Myocardial infarct (HCC)    • Neuralgia    • SSS (sick sinus syndrome) ([facility])      Past Surgical History:   Procedure Laterality Date   • ANOMALOUS PULMONARY VENOUS RETURN REPAIR, TOTAL     • AORTIC VALVE REPLACEMENT     • CARDIAC PACEMAKER PLACEMENT     • COLONOSCOPY     • JOINT REPLACEMENT     • KNEE SURGERY     • PARAESOPHAGEAL HERNIA REPAIR     • UPPER GASTROINTESTINAL ENDOSCOPY       Family History   Problem Relation Age of Onset   • Stroke Mother    • Hypertension Mother    • Heart disease Father    • Heart disease Paternal Uncle      Social History     Socioeconomic History   • Marital status:      Spouse name: Not on file   • Number of children: Not on file   • Years of education: Not on file   • Highest education level: Not on file   Occupational History   • Not on file   Tobacco Use   • Smoking status: Never   • Smokeless tobacco: Never   Substance and Sexual Activity   • Alcohol use: Never   • Drug use: Never   • Sexual activity: Not on file   Other Topics Concern   • Not on file   Social History Narrative   • Not on file     Social Determinants of Health     Financial Resource Strain: Not on file   Food Insecurity: Not on file   Transportation Needs: Not on file   Physical Activity: Not on file   Stress: Not on file   Social Connections: Not on file   Intimate Partner Violence: Not on file   Housing Stability: Not on file     Allergies   Allergen Reactions   • Sulfa Antibiotics Shortness Of Breath   • Lactose - Food Allergy Diarrhea and Other (See Comments)     Intolerance- gets gassy or diarrhea   • Penicillins Rash       Review of Systems     Denies any pain or shortness of breath  All other review of system negative        Objective   Vital signs reviewed from facility records  PHYSICAL EXAM:  GENERAL: no acute distress  SKIN: warm, dry, no rash, no cyanosis  HEENT: normocephalic, atraumatic, no JVD, no Thyromegaly, no lymphadenopathy  LUNGS: CTA, no wheezing, no rales, expanded equally, no chest tenderness   HEART: normal rhythm, normal rate, no murmur, no gallop  ABDOMEN: soft non tender non distended bs+, no guarding or rebound tenderness  :  no suprapubic tenderness  MUSCULOSKELETAL: strength about 4+/5 all extremities, decreased ROM within shoulders, no calf tenderness  NEUROLOGY: awake, alert, Ox3, CN2-12 intact  PSYCH: cooperative, pleasant         Pertinent Laboratory/Diagnostic Studies:  Recent labs and diagnostic tests reviewed in nursing home EMR    Current Medications   Medications reviewed

## 2023-06-14 ENCOUNTER — NURSING HOME VISIT (OUTPATIENT)
Dept: GERIATRICS | Facility: OTHER | Age: 88
End: 2023-06-14
Payer: MEDICARE

## 2023-06-14 DIAGNOSIS — I25.10 CORONARY ARTERY DISEASE INVOLVING NATIVE CORONARY ARTERY OF NATIVE HEART WITHOUT ANGINA PECTORIS: ICD-10-CM

## 2023-06-14 DIAGNOSIS — D64.9 NORMOCYTIC ANEMIA: ICD-10-CM

## 2023-06-14 DIAGNOSIS — F41.9 ANXIETY AND DEPRESSION: ICD-10-CM

## 2023-06-14 DIAGNOSIS — I50.32 CHRONIC DIASTOLIC CONGESTIVE HEART FAILURE (HCC): ICD-10-CM

## 2023-06-14 DIAGNOSIS — K21.00 GASTROESOPHAGEAL REFLUX DISEASE WITH ESOPHAGITIS WITHOUT HEMORRHAGE: ICD-10-CM

## 2023-06-14 DIAGNOSIS — R26.2 AMBULATORY DYSFUNCTION: ICD-10-CM

## 2023-06-14 DIAGNOSIS — R13.12 OROPHARYNGEAL DYSPHAGIA: ICD-10-CM

## 2023-06-14 DIAGNOSIS — I10 ESSENTIAL HYPERTENSION: ICD-10-CM

## 2023-06-14 DIAGNOSIS — I48.20 CHRONIC ATRIAL FIBRILLATION (HCC): ICD-10-CM

## 2023-06-14 DIAGNOSIS — F32.A ANXIETY AND DEPRESSION: ICD-10-CM

## 2023-06-14 DIAGNOSIS — N18.31 STAGE 3A CHRONIC KIDNEY DISEASE (HCC): ICD-10-CM

## 2023-06-14 DIAGNOSIS — J18.9 PNEUMONIA OF RIGHT UPPER LOBE DUE TO INFECTIOUS ORGANISM: Primary | ICD-10-CM

## 2023-06-14 PROCEDURE — 99316 NF DSCHRG MGMT 30 MIN+: CPT | Performed by: INTERNAL MEDICINE

## 2023-06-14 NOTE — ASSESSMENT & PLAN NOTE
Weight stable  Weight range from 135-138 lbs  Continue monitoring weight   Continue current meds  Continue monitoring fluid status

## 2023-06-14 NOTE — ASSESSMENT & PLAN NOTE
Off of oxygen  Completed course of antibiotics  Continue monitoring symptoms  Continue with activity as tolerable

## 2023-06-14 NOTE — PROGRESS NOTES
[facility] notes  SHORT TERM REHAB Discharge summary      NAME: Juilan Solis  AGE: 80 y o  SEX: female    DATE OF ENCOUNTER: 6/14/2023    Assessment and Plan   Pneumonia of right upper lobe due to infectious organism  Off of oxygen  Completed course of antibiotics  Continue monitoring symptoms  Continue with activity as tolerable      Ambulatory dysfunction  Multifactorial  Seems to be doing better  Reviewed therapy notes  Discussed with therapy about patient  Continue with safety measures   Continue with fall precautions     Anxiety and depression  Seems to be stable  Continue current meds  Continue follow up with Dr Albertina Rose as out patient  Continue monitoring symptoms     Atrial fibrillation ([address] )  Rate controlled  Continue current meds  Has PPM    Chronic diastolic congestive heart failure (HCC)  Weight stable  Weight range from 135-138 lbs  Continue monitoring weight   Continue current meds  Continue monitoring fluid status     Coronary artery disease involving native coronary artery of native heart without angina pectoris  Asymptomatic  Continue current meds  Continue monitoring symptoms    Essential hypertension  BP reviewed from facility records, acceptable range  Continue current meds  Continue monitoring BP    GERD (gastroesophageal reflux disease)  Continue current meds  Continue monitoring symptoms  Seems to be stable     Stage 3a chronic kidney disease (HCC)  Last Cr  1 19 and GFR 44  Continue monitoring kidney function as out patient     Normocytic anemia  Hb improving at 11 7  Continue monitoring for signs of bleeding     Dysphagia  Currently on regular diet   Continue monitoring for aspiration       Chief Complaint     No new complaints     History of Present Illness     79 yo female seen for discharge  Patient was initially admitted after hospitalization  Patient was in the hospital for acute respiratory failure due to pneumonia   Patient continued with therapy and was weaned off of oxygen  Patient seems to improve enough to be discharge to personal care  Reviewed nursing notes since last visit  PMHx     Past Medical History:   Diagnosis Date   • Anemia requiring transfusions    • Anxiety    • Aortic stenosis    • Arthritis    • Atrial fibrillation ([facility])    • Cervical dystonia    • Coronary artery disease    • Depression    • Dysphagia    • Elevated LFTs    • Gait difficulty    • GERD (gastroesophageal reflux disease)    • Hiatal hernia    • HL (hearing loss)    • Hyperlipidemia    • Hypertension    • Myocardial infarct ([facility])    • Neuralgia    • SSS (sick sinus syndrome) ([facility])      Past Surgical History:   Procedure Laterality Date   • ANOMALOUS PULMONARY VENOUS RETURN REPAIR, TOTAL     • AORTIC VALVE REPLACEMENT     • CARDIAC PACEMAKER PLACEMENT     • COLONOSCOPY     • JOINT REPLACEMENT     • KNEE SURGERY     • PARAESOPHAGEAL HERNIA REPAIR     • UPPER GASTROINTESTINAL ENDOSCOPY       Family History   Problem Relation Age of Onset   • Stroke Mother    • Hypertension Mother    • Heart disease Father    • Heart disease Paternal Uncle      Social History     Socioeconomic History   • Marital status:       Spouse name: Not on file   • Number of children: Not on file   • Years of education: Not on file   • Highest education level: Not on file   Occupational History   • Not on file   Tobacco Use   • Smoking status: Never   • Smokeless tobacco: Never   Substance and Sexual Activity   • Alcohol use: Never   • Drug use: Never   • Sexual activity: Not on file   Other Topics Concern   • Not on file   Social History Narrative   • Not on file     Social Determinants of Health     Financial Resource Strain: Not on file   Food Insecurity: Not on file   Transportation Needs: Not on file   Physical Activity: Not on file   Stress: Not on file   Social Connections: Not on file   Intimate Partner Violence: Not on file   Housing Stability: Not on file     Allergies   Allergen Reactions   • Sulfa Antibiotics Shortness Of Breath   • Lactose - Food Allergy Diarrhea and Other (See Comments)     Intolerance- gets gassy or diarrhea   • Penicillins Rash       Review of Systems     Sometimes weak legs   All other review of system negative      Objective   Vital signs reviewed from facility records  PHYSICAL EXAM:  GENERAL: no acute distress  SKIN: warm, dry, no rash, no cyanosis  HEENT: normocephalic, atraumatic, no JVD, no Thyromegaly, no lymphadenopathy  LUNGS: CTA, no wheezing, no rales, expanded equally, no chest tenderness   HEART: normal rhythm, normal rate, no murmur, no gallop  ABDOMEN: soft non tender non distended bs+, no guarding or rebound tenderness  :  no suprapubic tenderness  MUSCULOSKELETAL: strength about 4+/5 all extremities, no calf tenderness  NEUROLOGY: awake, alert, CN2-12 intact  PSYCH: cooperative, pleasant         Pertinent Laboratory/Diagnostic Studies:  Recent labs and diagnostic tests reviewed in nursing home EMR    Current Medications  Medications reviewed with patient/family  Prescriptions provided for medications needed    Prescriptions provided for services needed    Time spend on patient discharge 35 minutes

## 2023-06-14 NOTE — ASSESSMENT & PLAN NOTE
Seems to be stable  Continue current meds  Continue follow up with Dr Jj Pickard as out patient  Continue monitoring symptoms

## 2023-06-14 NOTE — ASSESSMENT & PLAN NOTE
Multifactorial  Seems to be doing better  Reviewed therapy notes  Discussed with therapy about patient  Continue with safety measures   Continue with fall precautions

## 2023-08-01 PROBLEM — J18.9 PNEUMONIA OF RIGHT UPPER LOBE DUE TO INFECTIOUS ORGANISM: Status: RESOLVED | Noted: 2023-06-02 | Resolved: 2023-08-01

## 2023-08-01 PROBLEM — N30.00 ACUTE CYSTITIS: Status: RESOLVED | Noted: 2023-06-02 | Resolved: 2023-08-01

## 2023-08-21 DIAGNOSIS — F41.9 ANXIETY: ICD-10-CM

## 2023-08-21 RX ORDER — CLONAZEPAM 0.5 MG/1
0.5 TABLET ORAL 3 TIMES DAILY
Qty: 60 TABLET | Refills: 0 | Status: SHIPPED | OUTPATIENT
Start: 2023-08-21

## 2023-08-21 RX ORDER — CLONAZEPAM 0.25 MG/1
0.25 TABLET, ORALLY DISINTEGRATING ORAL DAILY PRN
Qty: 15 TABLET | Refills: 0 | Status: SHIPPED | OUTPATIENT
Start: 2023-08-21

## 2023-08-21 RX ORDER — CLONAZEPAM 0.25 MG/1
0.25 TABLET, ORALLY DISINTEGRATING ORAL DAILY PRN
COMMUNITY
End: 2023-08-21 | Stop reason: SDUPTHER

## 2023-08-22 ENCOUNTER — NURSING HOME VISIT (OUTPATIENT)
Dept: GERIATRICS | Facility: OTHER | Age: 88
End: 2023-08-22
Payer: MEDICARE

## 2023-08-22 DIAGNOSIS — K92.2 ACUTE LOWER GI BLEEDING: Primary | ICD-10-CM

## 2023-08-22 DIAGNOSIS — R26.2 AMBULATORY DYSFUNCTION: ICD-10-CM

## 2023-08-22 DIAGNOSIS — J96.01 ACUTE RESPIRATORY FAILURE WITH HYPOXIA (HCC): ICD-10-CM

## 2023-08-22 DIAGNOSIS — E87.1 HYPONATREMIA: ICD-10-CM

## 2023-08-22 DIAGNOSIS — N17.9 AKI (ACUTE KIDNEY INJURY) (HCC): ICD-10-CM

## 2023-08-22 DIAGNOSIS — I48.20 CHRONIC ATRIAL FIBRILLATION (HCC): ICD-10-CM

## 2023-08-22 DIAGNOSIS — F32.A ANXIETY AND DEPRESSION: ICD-10-CM

## 2023-08-22 DIAGNOSIS — F41.9 ANXIETY AND DEPRESSION: ICD-10-CM

## 2023-08-22 DIAGNOSIS — I10 ESSENTIAL HYPERTENSION: ICD-10-CM

## 2023-08-22 DIAGNOSIS — K21.00 GASTROESOPHAGEAL REFLUX DISEASE WITH ESOPHAGITIS WITHOUT HEMORRHAGE: ICD-10-CM

## 2023-08-22 DIAGNOSIS — I50.32 CHRONIC DIASTOLIC CONGESTIVE HEART FAILURE (HCC): ICD-10-CM

## 2023-08-22 DIAGNOSIS — R23.0 PERIPHERAL CYANOSIS: ICD-10-CM

## 2023-08-22 DIAGNOSIS — I25.10 CORONARY ARTERY DISEASE INVOLVING NATIVE CORONARY ARTERY OF NATIVE HEART WITHOUT ANGINA PECTORIS: ICD-10-CM

## 2023-08-22 PROBLEM — R06.09 EXERTIONAL DYSPNEA: Status: ACTIVE | Noted: 2019-07-02

## 2023-08-22 PROCEDURE — 99306 1ST NF CARE HIGH MDM 50: CPT | Performed by: INTERNAL MEDICINE

## 2023-08-22 RX ORDER — LIDOCAINE 4 G/G
PATCH TOPICAL
COMMUNITY

## 2023-08-22 NOTE — ASSESSMENT & PLAN NOTE
Cr was elevated but improved to 1.21 on discharge   Baseline is around 0.9 to 1.2   At the hospital Cr has been running 1.2 - 1.5   Will repeat to monitor

## 2023-08-22 NOTE — ASSESSMENT & PLAN NOTE
Continue monitoring weight  Did have thoracocentesis   Echo on 5/23 with EF of 55% mild pulmonary HTN and mildly reduced RV systolic function  Continue monitoring symptoms  Continue current med  Continue oxygen and try tapering off if possible   Weight on admission is 144lbs

## 2023-08-22 NOTE — ASSESSMENT & PLAN NOTE
Hb was 7.5 - 8.0  Continue monitoring for bleeding   Was evaluated GI  Did not want any further intervention at present time  Continue monitoring for bleeding   Will repeat CBC

## 2023-08-22 NOTE — PROGRESS NOTES
[facility] [address] [facility] home notes  SHORT TERM REHAB       NAME: Jessica Sargent  AGE: 80 y.o. SEX: female    DATE OF ENCOUNTER: 8/22/2023    Assessment and Plan   Acute lower GI bleeding  Hb was 7.5 - 8.0  Continue monitoring for bleeding   Was evaluated GI  Did not want any further intervention at present time  Continue monitoring for bleeding   Will repeat CBC     Ambulatory dysfunction  Hx of multiple falls  Continue with safety measures  Continue with fall precautions   PT/OT eval and treat  Multifactorial     Acute respiratory failure with hypoxia (HCC)  Continue oxygen   Continue monitoring symptoms       Anxiety and depression  Seems stable  Continue current med  Continue monitoring symptoms   Continue follow up with Dr. Alonso Mena as out patient   Continue with supportive care     Atrial fibrillation ([address])  Rate controlled  On eliquis - but continue closely monitoring for bleeding   Continue with monitoring rate   Has PPM     Chronic diastolic congestive heart failure ([address])  Continue monitoring weight  Did have thoracocentesis   Echo on 5/23 with EF of 55% mild pulmonary HTN and mildly reduced RV systolic function  Continue monitoring symptoms  Continue current med  Continue oxygen and try tapering off if possible   Weight on admission is 144lbs     Coronary artery disease involving native coronary artery of native heart without angina pectoris  Did have troponin elevated in the hospital   But asymptomatic  Continue monitoring symptoms  Continue current meds     Essential hypertension  Continue current med  Continue monitoring BP      MEGHAN (acute kidney injury) ([address])  Cr was elevated but improved to 1.21 on discharge   Baseline is around 0.9 to 1.2   At the hospital Cr has been running 1.2 - 1.5   Will repeat to monitor    GERD (gastroesophageal reflux disease)  Continue PPI  Continue monitoring symptoms  Also on high dose of PPI due to ?  Of GI bleed     Peripheral cyanosis  Has no complaints but all fingers look cyanotic   Radial pulses intact   ? Of raynauds     Hyponatremia  Last sodium is 134  Will repeat BMP         Chief Complaint     No new complaints     History of Present Illness     81 yo female seen for admission to [address] [place] [address] after hospitalization. Patient history is all over the place she is mixing history from different event but able to cue her and get some information. She reports she was having blood in the stool at the personal care when she was sent to the hospital. In the hospital she was managed for GI bleed and CHF. She had her H/H monitor and since stable and not dropping significantly no further intervention was taken. Patient also had respiratory distress and found to be in acute CHF and had pleural effusion requiring thoracocentesis. She was treated with lasix for CHF then once stable she was then discharged to [address] [place] [address] for therapy. Reviewed labs and imaging reports from the hospital records.      PMHx     Past Medical History:   Diagnosis Date   • Anemia requiring transfusions    • Anxiety    • Aortic stenosis    • Arthritis    • Atrial fibrillation (HCC)    • Cervical dystonia    • Coronary artery disease    • Depression    • Dysphagia    • Elevated LFTs    • Gait difficulty    • GERD (gastroesophageal reflux disease)    • Hiatal hernia    • HL (hearing loss)    • Hyperlipidemia    • Hypertension    • Myocardial infarct (HCC)    • Neuralgia    • SSS (sick sinus syndrome) ([facility])      Past Surgical History:   Procedure Laterality Date   • ANOMALOUS PULMONARY VENOUS RETURN REPAIR, TOTAL     • AORTIC VALVE REPLACEMENT     • CARDIAC PACEMAKER PLACEMENT     • COLONOSCOPY     • JOINT REPLACEMENT     • KNEE SURGERY     • PARAESOPHAGEAL HERNIA REPAIR     • UPPER GASTROINTESTINAL ENDOSCOPY       Family History   Problem Relation Age of Onset   • Stroke Mother    • Hypertension Mother    • Heart disease Father    • Heart disease Paternal Uncle      Social History     Socioeconomic History   • Marital status:      Spouse name: None   • Number of children: None   • Years of education: None   • Highest education level: None   Occupational History   • None   Tobacco Use   • Smoking status: Never   • Smokeless tobacco: Never   Substance and Sexual Activity   • Alcohol use: Never   • Drug use: Never   • Sexual activity: None   Other Topics Concern   • None   Social History Narrative   • None     Social Determinants of Health     Financial Resource Strain: Not on file   Food Insecurity: Not on file   Transportation Needs: Not on file   Physical Activity: Not on file   Stress: Not on file   Social Connections: Not on file   Intimate Partner Violence: Not on file   Housing Stability: Not on file     Allergies   Allergen Reactions   • Sulfa Antibiotics Shortness Of Breath   • Lactose - Food Allergy Diarrhea and Other (See Comments)     Intolerance- gets gassy or diarrhea   • Penicillins Rash       Review of Systems     Denies any pain or shortness of breath but did have complaint of shortness of breath to staff earlier  All other review of system negative        Objective   Vital signs:  BP: 112/53  HR: 60  RR: 22  TEMP: 97.6F  SAT.: 100% on 2L    PHYSICAL EXAM:  GENERAL: no acute distress, slightly heavy breathing   SKIN: warm, dry, no rash, surgical scar on the knees, +cyanosis of the fingers  HEENT: normocephalic, atraumatic, no JVD, no Thyromegaly, no lymphadenopathy  LUNGS: CTA, no wheezing, no rales, expanded equally, no chest tenderness   HEART: normal rhythm, normal rate, no murmur, no gallop  ABDOMEN: soft non tender non distended bs+, no guarding or rebound tenderness  :  no suprapubic tenderness  MUSCULOSKELETAL: strength about 4+/5 all extremities, decreased ROM within right shoulder, bilateral lower leg edema, no calf tenderness  NEUROLOGY: awake, alert, Ox3 but thought it was 20th, able to recall 2/3 object. CN2-12 intact. PSYCH: cooperative, pleasant.        Pertinent Laboratory/Diagnostic Studies:  Recent labs and diagnostic tests reviewed in nursing home EMR    Current Medications   Medications reviewed and signed off on nursing home EMR.

## 2023-08-22 NOTE — ASSESSMENT & PLAN NOTE
Hx of multiple falls  Continue with safety measures  Continue with fall precautions   PT/OT eval and treat  Multifactorial

## 2023-08-22 NOTE — ASSESSMENT & PLAN NOTE
Did have troponin elevated in the hospital   But asymptomatic  Continue monitoring symptoms  Continue current meds

## 2023-08-22 NOTE — ASSESSMENT & PLAN NOTE
Seems stable  Continue current med  Continue monitoring symptoms   Continue follow up with Dr. Yulissa Hines as out patient   Continue with supportive care

## 2023-08-22 NOTE — ASSESSMENT & PLAN NOTE
Rate controlled  On eliquis - but continue closely monitoring for bleeding   Continue with monitoring rate   Has PPM

## 2023-08-28 ENCOUNTER — NURSING HOME VISIT (OUTPATIENT)
Dept: GERIATRICS | Facility: OTHER | Age: 88
End: 2023-08-28
Payer: MEDICARE

## 2023-08-28 DIAGNOSIS — K92.2 ACUTE LOWER GI BLEEDING: ICD-10-CM

## 2023-08-28 DIAGNOSIS — I50.32 CHRONIC DIASTOLIC CONGESTIVE HEART FAILURE (HCC): Primary | ICD-10-CM

## 2023-08-28 DIAGNOSIS — R26.2 AMBULATORY DYSFUNCTION: ICD-10-CM

## 2023-08-28 PROCEDURE — 99309 SBSQ NF CARE MODERATE MDM 30: CPT | Performed by: INTERNAL MEDICINE

## 2023-08-28 NOTE — ASSESSMENT & PLAN NOTE
Hx of multiple falls  Continues to work with therapy   Reviewed therapy notes   Discussed with therapy about patient  Continue with safety precautions and fall precautions

## 2023-08-28 NOTE — PROGRESS NOTES
[facility] [address] [facility] notes  SHORT TERM REHAB       NAME: Sally Nichole  AGE: 80 y.o. SEX: female    DATE OF ENCOUNTER: 8/28/2023    Assessment and Plan   Chronic diastolic congestive heart failure (HCC)  Weight is up 4 lbs today   Will give extra 40mg po lasix for 5 days with the 20mg po daily also due to shortness of breath with exertion as per patient and therapist  Will recheck BMP to monitor kidney function  Continue monitoring weight         Acute lower GI bleeding  Hb was 7.4 but hospital was 7.5 to 8.0  Will repeat CBC to monitor   No acute signs of bleeding       Ambulatory dysfunction  Hx of multiple falls  Continues to work with therapy   Reviewed therapy notes   Discussed with therapy about patient  Continue with safety precautions and fall precautions       Chief Complaint     No new complaints     History of Present Illness     81 yo female seen for follow up. Patient seen for her CHF, ambulatory dysfunction and GI bleed. Reviewed nursing notes since last visit.      PMHx     Past Medical History:   Diagnosis Date   • Anemia requiring transfusions    • Anxiety    • Aortic stenosis    • Arthritis    • Atrial fibrillation (HCC)    • Cervical dystonia    • Coronary artery disease    • Depression    • Dysphagia    • Elevated LFTs    • Gait difficulty    • GERD (gastroesophageal reflux disease)    • Hiatal hernia    • HL (hearing loss)    • Hyperlipidemia    • Hypertension    • Myocardial infarct (HCC)    • Neuralgia    • SSS (sick sinus syndrome) ([facility])      Past Surgical History:   Procedure Laterality Date   • ANOMALOUS PULMONARY VENOUS RETURN REPAIR, TOTAL     • AORTIC VALVE REPLACEMENT     • CARDIAC PACEMAKER PLACEMENT     • COLONOSCOPY     • JOINT REPLACEMENT     • KNEE SURGERY     • PARAESOPHAGEAL HERNIA REPAIR     • UPPER GASTROINTESTINAL ENDOSCOPY       Family History   Problem Relation Age of Onset   • Stroke Mother    • Hypertension Mother    • Heart disease Father    • Heart disease Paternal Uncle      Social History     Socioeconomic History   • Marital status:      Spouse name: Not on file   • Number of children: Not on file   • Years of education: Not on file   • Highest education level: Not on file   Occupational History   • Not on file   Tobacco Use   • Smoking status: Never   • Smokeless tobacco: Never   Substance and Sexual Activity   • Alcohol use: Never   • Drug use: Never   • Sexual activity: Not on file   Other Topics Concern   • Not on file   Social History Narrative   • Not on file     Social Determinants of Health     Financial Resource Strain: Not on file   Food Insecurity: Not on file   Transportation Needs: Not on file   Physical Activity: Not on file   Stress: Not on file   Social Connections: Not on file   Intimate Partner Violence: Not on file   Housing Stability: Not on file     Allergies   Allergen Reactions   • Sulfa Antibiotics Shortness Of Breath   • Lactose - Food Allergy Diarrhea and Other (See Comments)     Intolerance- gets gassy or diarrhea   • Penicillins Rash       Review of Systems     Shortness of breath on exertion   All other review of system negative      Objective   Vital signs reviewed from facility records. PHYSICAL EXAM:  GENERAL: no acute distress, slight heavy breathing but seems to be chronic   SKIN: warm, dry, no rash, + cyanosis better in the fingers and toes  HEENT: normocephalic, atraumatic, no JVD, no Thyromegaly, no lymphadenopathy  LUNGS: decreased overall, no wheezing, no rales, expanded equally, no chest tenderness   HEART: normal rhythm, normal rate, no murmur, no gallop  ABDOMEN: soft non tender non distended bs+, no guarding or rebound tenderness  :  no suprapubic tenderness  MUSCULOSKELETAL: strength about 4+/5 all extremities, bilateral lower leg edema, no calf tenderness  NEUROLOGY: awake, alert, Ox3, CN2-12 intact. PSYCH: cooperative, pleasant.        Pertinent Laboratory/Diagnostic Studies:  Recent labs and diagnostic tests reviewed in nursing home EMR    Current Medications   Medications reviewed and signed off on nursing home EMR.

## 2023-08-28 NOTE — ASSESSMENT & PLAN NOTE
Weight is up 4 lbs today   Will give extra 40mg po lasix for 5 days with the 20mg po daily also due to shortness of breath with exertion as per patient and therapist  Will recheck BMP to monitor kidney function  Continue monitoring weight

## 2023-09-06 ENCOUNTER — NURSING HOME VISIT (OUTPATIENT)
Dept: GERIATRICS | Facility: OTHER | Age: 88
End: 2023-09-06
Payer: MEDICARE

## 2023-09-06 DIAGNOSIS — K92.2 ACUTE LOWER GI BLEEDING: ICD-10-CM

## 2023-09-06 DIAGNOSIS — I50.32 CHRONIC DIASTOLIC CONGESTIVE HEART FAILURE (HCC): Primary | ICD-10-CM

## 2023-09-06 DIAGNOSIS — R26.2 AMBULATORY DYSFUNCTION: ICD-10-CM

## 2023-09-06 PROCEDURE — 99309 SBSQ NF CARE MODERATE MDM 30: CPT | Performed by: INTERNAL MEDICINE

## 2023-09-06 NOTE — PROGRESS NOTES
[facility] [address] [facility] notes  SHORT TERM REHAB       NAME: Derek Patel  AGE: 80 y.o. SEX: female    DATE OF ENCOUNTER: 9/6/2023    Assessment and Plan   Chronic diastolic congestive heart failure (HCC)  Weight up 2lbs since admission   Seems to be stable  Continue current meds  Continue monitoring weight and fluid status     Ambulatory dysfunction  Multifactorial  Continues with therapy   Reviewed therapy notes   Discussed with therapy about patient   Continue with safety measures and fall precautions     Acute lower GI bleeding  Repeat hb was 7.4 which is stable  Continue current med  Continue monitoring for signs of bleeding       Chief Complaint     No new complaints     History of Present Illness     81 yo female seen for follow up. Patient seen for ambulatory dysfunction, CHF and GI bleed. Reviewed nursing notes since last visit. PMHx     Past Medical History:   Diagnosis Date   • Anemia requiring transfusions    • Anxiety    • Aortic stenosis    • Arthritis    • Atrial fibrillation (HCC)    • Cervical dystonia    • Coronary artery disease    • Depression    • Dysphagia    • Elevated LFTs    • Gait difficulty    • GERD (gastroesophageal reflux disease)    • Hiatal hernia    • HL (hearing loss)    • Hyperlipidemia    • Hypertension    • Myocardial infarct (HCC)    • Neuralgia    • SSS (sick sinus syndrome) ([facility])      Past Surgical History:   Procedure Laterality Date   • ANOMALOUS PULMONARY VENOUS RETURN REPAIR, TOTAL     • AORTIC VALVE REPLACEMENT     • CARDIAC PACEMAKER PLACEMENT     • COLONOSCOPY     • JOINT REPLACEMENT     • KNEE SURGERY     • PARAESOPHAGEAL HERNIA REPAIR     • UPPER GASTROINTESTINAL ENDOSCOPY       Family History   Problem Relation Age of Onset   • Stroke Mother    • Hypertension Mother    • Heart disease Father    • Heart disease Paternal Uncle      Social History     Socioeconomic History   • Marital status:       Spouse name: Not on file   • Number of children: Not on file   • Years of education: Not on file   • Highest education level: Not on file   Occupational History   • Not on file   Tobacco Use   • Smoking status: Never   • Smokeless tobacco: Never   Substance and Sexual Activity   • Alcohol use: Never   • Drug use: Never   • Sexual activity: Not on file   Other Topics Concern   • Not on file   Social History Narrative   • Not on file     Social Determinants of Health     Financial Resource Strain: Not on file   Food Insecurity: Not on file   Transportation Needs: Not on file   Physical Activity: Not on file   Stress: Not on file   Social Connections: Not on file   Intimate Partner Violence: Not on file   Housing Stability: Not on file     Allergies   Allergen Reactions   • Sulfa Antibiotics Shortness Of Breath   • Lactose - Food Allergy Diarrhea and Other (See Comments)     Intolerance- gets gassy or diarrhea   • Penicillins Rash       Review of Systems     Denies any pain or shortness of breath   All other review of system negative        Objective   Vital signs reviewed from facility records. PHYSICAL EXAM:  GENERAL: no acute distress  SKIN: warm, dry, no rash, + cyanosis of the fingers  HEENT: normocephalic, atraumatic, no JVD, no Thyromegaly, no lymphadenopathy  LUNGS: decreased at the bases overall, no wheezing, no rales, expanded equally, no chest tenderness   HEART: normal rhythm, normal rate, no murmur, no gallop  ABDOMEN: soft non tender non distended bs+, no guarding or rebound tenderness  :  no suprapubic tenderness  MUSCULOSKELETAL: strength about 4+/5 all extremities, bilateral lower leg edema, no calf tenderness  NEUROLOGY: awake, alert, CN2-12 intact. PSYCH: cooperative, pleasant. Pertinent Laboratory/Diagnostic Studies:  Recent labs and diagnostic tests reviewed in nursing home EMR    Current Medications   Medications reviewed and signed off on nursing home EMR.

## 2023-09-06 NOTE — ASSESSMENT & PLAN NOTE
Multifactorial  Continues with therapy   Reviewed therapy notes   Discussed with therapy about patient   Continue with safety measures and fall precautions

## 2023-09-06 NOTE — ASSESSMENT & PLAN NOTE
Weight up 2lbs since admission   Seems to be stable  Continue current meds  Continue monitoring weight and fluid status

## 2023-09-11 DIAGNOSIS — F41.9 ANXIETY: ICD-10-CM

## 2023-09-11 RX ORDER — CLONAZEPAM 0.5 MG/1
0.5 TABLET ORAL 3 TIMES DAILY
Qty: 42 TABLET | Refills: 1 | Status: SHIPPED | OUTPATIENT
Start: 2023-09-11

## 2023-09-12 ENCOUNTER — NURSING HOME VISIT (OUTPATIENT)
Dept: GERIATRICS | Facility: OTHER | Age: 88
End: 2023-09-12
Payer: MEDICARE

## 2023-09-12 DIAGNOSIS — K92.2 ACUTE LOWER GI BLEEDING: ICD-10-CM

## 2023-09-12 DIAGNOSIS — N17.9 AKI (ACUTE KIDNEY INJURY) (HCC): ICD-10-CM

## 2023-09-12 DIAGNOSIS — I50.32 CHRONIC DIASTOLIC CONGESTIVE HEART FAILURE (HCC): Primary | ICD-10-CM

## 2023-09-12 DIAGNOSIS — R26.2 AMBULATORY DYSFUNCTION: ICD-10-CM

## 2023-09-12 PROCEDURE — 99309 SBSQ NF CARE MODERATE MDM 30: CPT | Performed by: INTERNAL MEDICINE

## 2023-09-12 NOTE — ASSESSMENT & PLAN NOTE
Cr. Is 1.55  Continue monitoring as needed   Hospital was running at 1.2 to 1.5 and prior to that baseline was around 0.9 to 1.2  Continue current meds  Maybe elevated due to CHF treatment

## 2023-09-12 NOTE — ASSESSMENT & PLAN NOTE
Weight down to 141lbs and went as high as 151lbs  Continue current meds  Continue monitoring weight  Continue with current meds   Continue monitoring symptoms  Chronic oxygen

## 2023-09-12 NOTE — ASSESSMENT & PLAN NOTE
Continues to work with therapy   Multifactorial  Talked to therapy about patient   Reviewed therapy notes  Continue with safety measures and fall precautions

## 2023-09-12 NOTE — PROGRESS NOTES
[facility] [address] [facility] home notes  SHORT TERM REHAB       NAME: Derek Patel  AGE: 80 y.o. SEX: female    DATE OF ENCOUNTER: 9/12/2023    Assessment and Plan   Chronic diastolic congestive heart failure (HCC)  Weight down to 141lbs and went as high as 151lbs  Continue current meds  Continue monitoring weight  Continue with current meds   Continue monitoring symptoms  Chronic oxygen       Ambulatory dysfunction  Continues to work with therapy   Multifactorial  Talked to therapy about patient   Reviewed therapy notes  Continue with safety measures and fall precautions     Acute lower GI bleeding  Hb improving now at 8.0  Continue monitoring CBC   No acute signs of bleeding     MEGHAN (acute kidney injury) ([address] [facility])  Cr. Is 1.55  Continue monitoring as needed   Hospital was running at 1.2 to 1.5 and prior to that baseline was around 0.9 to 1.2  Continue current meds  Maybe elevated due to CHF treatment       Chief Complaint     No new complaints    History of Present Illness     81 yo female seen for follow up. Patient seen for MEGHAN, anemia, CHF and ambulatory dysfunction. Reviewed nursing notes since last visit.      PMHx     Past Medical History:   Diagnosis Date   • Anemia requiring transfusions    • Anxiety    • Aortic stenosis    • Arthritis    • Atrial fibrillation [facility])    • Cervical dystonia    • Coronary artery disease    • Depression    • Dysphagia    • Elevated LFTs    • Gait difficulty    • GERD (gastroesophageal reflux disease)    • Hiatal hernia    • HL (hearing loss)    • Hyperlipidemia    • Hypertension    • Myocardial infarct (HCC)    • Neuralgia    • SSS (sick sinus syndrome) ([address] [facility])      Past Surgical History:   Procedure Laterality Date   • ANOMALOUS PULMONARY VENOUS RETURN REPAIR, TOTAL     • AORTIC VALVE REPLACEMENT     • CARDIAC PACEMAKER PLACEMENT     • COLONOSCOPY     • JOINT REPLACEMENT     • KNEE SURGERY     • PARAESOPHAGEAL HERNIA REPAIR     • UPPER GASTROINTESTINAL ENDOSCOPY       Family History   Problem Relation Age of Onset   • Stroke Mother    • Hypertension Mother    • Heart disease Father    • Heart disease Paternal Uncle      Social History     Socioeconomic History   • Marital status:       Spouse name: Not on file   • Number of children: Not on file   • Years of education: Not on file   • Highest education level: Not on file   Occupational History   • Not on file   Tobacco Use   • Smoking status: Never   • Smokeless tobacco: Never   Substance and Sexual Activity   • Alcohol use: Never   • Drug use: Never   • Sexual activity: Not on file   Other Topics Concern   • Not on file   Social History Narrative   • Not on file     Social Determinants of Health     Financial Resource Strain: Not on file   Food Insecurity: Not on file   Transportation Needs: Not on file   Physical Activity: Not on file   Stress: Not on file   Social Connections: Not on file   Intimate Partner Violence: Not on file   Housing Stability: Not on file     Allergies   Allergen Reactions   • Sulfa Antibiotics Shortness Of Breath   • Lactose - Food Allergy Diarrhea and Other (See Comments)     Intolerance- gets gassy or diarrhea   • Penicillins Rash       Review of Systems     Denies any pain or shortness of breath   All other review of system negative        Objective   Vital signs reviewed from facility records    PHYSICAL EXAM:  GENERAL: no acute distress, chronically ill appearing, chronically heavy breathing    SKIN: warm, dry, no rash, oxygen via nasal cannula   HEENT: normocephalic, atraumatic, no JVD, no Thyromegaly, no lymphadenopathy  LUNGS: decreased at the bases, no wheezing, no rales, expanded equally, no chest tenderness   HEART: normal rhythm, normal rate, no murmur, no gallop  ABDOMEN: soft non tender non distended bs+, no guarding or rebound tenderness  :  no suprapubic tenderness  MUSCULOSKELETAL: strength about 4+/5 all extremities,  bilateral lower leg edema, no calf tenderness  NEUROLOGY: awake, alert, CN2-12 intact. PSYCH: cooperative, pleasant. Pertinent Laboratory/Diagnostic Studies:  Recent labs and diagnostic tests reviewed in nursing home EMR    Current Medications   Medications reviewed and signed off on nursing home EMR.

## 2023-09-15 ENCOUNTER — TELEPHONE (OUTPATIENT)
Dept: OTHER | Facility: OTHER | Age: 88
End: 2023-09-15

## 2023-09-16 NOTE — TELEPHONE ENCOUNTER
Rachell Palm called, requesting a call back from on call provider, to report pt's fall.  Provider paged via [facility]

## 2023-09-19 ENCOUNTER — TELEPHONE (OUTPATIENT)
Dept: OTHER | Facility: OTHER | Age: 88
End: 2023-09-19

## 2023-09-20 NOTE — TELEPHONE ENCOUNTER
[phone]/TCCPDF from [address] calling to speak to on- call. Pt having suicidal ideation. Dr Marylee Bells was paged via [facility]    Please allow the on-call provider 20-30 mins to respond. If you have not heard from them within that time, please feel free to call back.

## 2023-10-03 ENCOUNTER — TELEPHONE (OUTPATIENT)
Dept: OTHER | Facility: OTHER | Age: 88
End: 2023-10-03

## 2023-10-04 ENCOUNTER — NURSING HOME VISIT (OUTPATIENT)
Dept: GERIATRICS | Facility: OTHER | Age: 88
End: 2023-10-04
Payer: MEDICARE

## 2023-10-04 DIAGNOSIS — J96.01 ACUTE RESPIRATORY FAILURE WITH HYPOXIA (HCC): ICD-10-CM

## 2023-10-04 DIAGNOSIS — N17.9 AKI (ACUTE KIDNEY INJURY) (HCC): ICD-10-CM

## 2023-10-04 DIAGNOSIS — F41.9 ANXIETY AND DEPRESSION: ICD-10-CM

## 2023-10-04 DIAGNOSIS — J90 PLEURAL EFFUSION: ICD-10-CM

## 2023-10-04 DIAGNOSIS — S01.01XD SCALP LACERATION, SUBSEQUENT ENCOUNTER: Primary | ICD-10-CM

## 2023-10-04 DIAGNOSIS — I50.32 CHRONIC DIASTOLIC CONGESTIVE HEART FAILURE (HCC): ICD-10-CM

## 2023-10-04 DIAGNOSIS — R26.2 AMBULATORY DYSFUNCTION: ICD-10-CM

## 2023-10-04 DIAGNOSIS — F32.A ANXIETY AND DEPRESSION: ICD-10-CM

## 2023-10-04 DIAGNOSIS — F41.9 ANXIETY: ICD-10-CM

## 2023-10-04 PROBLEM — I73.9 PERIPHERAL ARTERIAL DISEASE (HCC): Status: ACTIVE | Noted: 2023-10-02
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
## 2023-10-04 PROBLEM — I73.00 RAYNAUD'S PHENOMENON WITHOUT GANGRENE: Status: ACTIVE | Noted: 2023-10-01

## 2023-10-04 PROCEDURE — 99306 1ST NF CARE HIGH MDM 50: CPT | Performed by: INTERNAL MEDICINE

## 2023-10-04 RX ORDER — CLONAZEPAM 0.5 MG/1
0.5 TABLET ORAL 3 TIMES DAILY
Qty: 90 TABLET | Refills: 1 | Status: SHIPPED | OUTPATIENT
Start: 2023-10-04

## 2023-10-04 RX ORDER — CLONAZEPAM 0.25 MG/1
0.25 TABLET, ORALLY DISINTEGRATING ORAL DAILY PRN
Qty: 30 TABLET | Refills: 0 | Status: SHIPPED | OUTPATIENT
Start: 2023-10-04

## 2023-10-04 RX ORDER — CLONAZEPAM 0.25 MG/1
0.25 TABLET, ORALLY DISINTEGRATING ORAL DAILY PRN
COMMUNITY
End: 2023-10-04 | Stop reason: SDUPTHER

## 2023-10-04 NOTE — PROGRESS NOTES
[facility] notes  LONG TERM CARE READMISSION      NAME: Pino Hooper  AGE: 80 y.o. SEX: female    DATE OF ENCOUNTER: 10/4/2023    Assessment and Plan   Scalp laceration, subsequent encounter  Staples placed in the hospital  Continue with monitoring for infection  Secondary to fall     Chronic diastolic congestive heart failure ([address])  Continue current meds  Does seem to be breathing heavy which seems to be at baseline  Continue monitoring weight  Continue monitoring fluids status  Does have pleural effusion and patient did not want any procedure  Continue current meds  Discussed with son about plan and goals of care   He stated he will discuss with the rest of the family and get back to the staff. He reported she would not want anything aggressive done but once he talks to the rest of the family and will get back. He has the comfort care package which he will review with the family. Acute respiratory failure with hypoxia (HCC)  Continues on oxygen  Continue monitoring symptoms     Anxiety and depression  Continue current meds  Continue monitoring symptoms  Continue with supportive care       Pleural effusion  Continue with current meds  Continue with oxygen supplement       Ambulatory dysfunction  Multifactorial but overall continues to decline  Continue with safety measures   Continue with fall precautions     MEGHAN (acute kidney injury) ([address])  Cr was 1.67 on admission but 1.2 on discharge from the hospital  Continue monitoring Cr        Chief Complaint     No new complaints     History of Present Illness     81 yo female seen for readmission to [facility] after hospitalization. Patient reports having a fall but cannot give proper details due to overall decline and also decline in her cognition.  As per staff patient had a fall with a laceration scalp which looked like she needed sutures so patient was sent to the hospital. Patient was evaluated for trauma no significant findings and had patient admitted due to her pleural effusion and evaluated. At which point she did not want any further treatment. Once she was stable she was discharged to [address] [place] [address] for therapy. Reviewed hospital records. Called and talked to son about patient condition. PMHx     Past Medical History:   Diagnosis Date   • Anemia requiring transfusions    • Anxiety    • Aortic stenosis    • Arthritis    • Atrial fibrillation ([facility])    • Cervical dystonia    • Coronary artery disease    • Depression    • Dysphagia    • Elevated LFTs    • Gait difficulty    • GERD (gastroesophageal reflux disease)    • Hiatal hernia    • HL (hearing loss)    • Hyperlipidemia    • Hypertension    • Myocardial infarct ([facility])    • Neuralgia    • SSS (sick sinus syndrome) ([facility])      Past Surgical History:   Procedure Laterality Date   • ANOMALOUS PULMONARY VENOUS RETURN REPAIR, TOTAL     • AORTIC VALVE REPLACEMENT     • CARDIAC PACEMAKER PLACEMENT     • COLONOSCOPY     • JOINT REPLACEMENT     • KNEE SURGERY     • PARAESOPHAGEAL HERNIA REPAIR     • UPPER GASTROINTESTINAL ENDOSCOPY       Family History   Problem Relation Age of Onset   • Stroke Mother    • Hypertension Mother    • Heart disease Father    • Heart disease Paternal Uncle      Social History     Socioeconomic History   • Marital status:       Spouse name: None   • Number of children: None   • Years of education: None   • Highest education level: None   Occupational History   • None   Tobacco Use   • Smoking status: Never   • Smokeless tobacco: Never   Substance and Sexual Activity   • Alcohol use: Never   • Drug use: Never   • Sexual activity: None   Other Topics Concern   • None   Social History Narrative   • None     Social Determinants of Health     Financial Resource Strain: Not on file   Food Insecurity: Not on file   Transportation Needs: Not on file   Physical Activity: Not on file   Stress: Not on file   Social Connections: Not on file   Intimate Partner Violence: Not on file   Housing Stability: Not on file     Allergies   Allergen Reactions   • Sulfa Antibiotics Shortness Of Breath   • Lactose - Food Allergy Diarrhea and Other (See Comments)     Intolerance- gets gassy or diarrhea   • Penicillins Rash       Review of Systems     Denies any pain or shortness of breath   All other review of system negative        Objective   Vital signs:  BP: 128/74  HR: 69  RR: 19  TEMP: 97.3F  SAT.: 94% on 3L    PHYSICAL EXAM:  GENERAL: looks like labored breath but denies any shortness of breath and has been like this since first admission, chronically ill appearing    SKIN: warm, dry, no rash, + cyanosis fingers from raynaud's, surgical scar on the knees,  HEENT: normocephalic, 5 staples on the scalp, no JVD, no Thyromegaly, no lymphadenopathy  LUNGS: decreased breath sounds at bases but more on the right, no wheezing, no rales, expanded equally, no chest tenderness   HEART: normal rhythm, normal rate, no murmur, no gallop. PPM on the left upper chest wall  ABDOMEN: soft non tender non distended bs+, no guarding or rebound tenderness  :  no suprapubic tenderness  MUSCULOSKELETAL: strength about 4+/5 all extremities,bilateral lower leg edema, no calf tenderness  NEUROLOGY: awake, alert, oriented to person, place but not time only year, CN2-12 intact. PSYCH: cooperative, pleasant. Pertinent Laboratory/Diagnostic Studies:  Recent labs and diagnostic tests reviewed in nursing home EMR    Current Medications   Medications reviewed and signed off on nursing home EMR.         Charlie Riley MD

## 2023-10-04 NOTE — ASSESSMENT & PLAN NOTE
Multifactorial but overall continues to decline  Continue with safety measures   Continue with fall precautions

## 2023-10-04 NOTE — ASSESSMENT & PLAN NOTE
Continue current meds  Does seem to be breathing heavy which seems to be at baseline  Continue monitoring weight  Continue monitoring fluids status  Does have pleural effusion and patient did not want any procedure  Continue current meds  Discussed with son about plan and goals of care   He stated he will discuss with the rest of the family and get back to the staff. He reported she would not want anything aggressive done but once he talks to the rest of the family and will get back. He has the comfort care package which he will review with the family.

## 2023-10-26 ENCOUNTER — TELEPHONE (OUTPATIENT)
Dept: OTHER | Facility: OTHER | Age: 88
End: 2023-10-26

## 2023-10-26 NOTE — TELEPHONE ENCOUNTER
Called in to speak to on call provider regarding respiratory distress pt is currently in. Paged on call provider via TC.

## 2024-03-28 NOTE — ASSESSMENT & PLAN NOTE
Continue current meds  Continue monitoring symptoms   Continue with supportive care  Follows up with Dr Jazmin Escobar 160